# Patient Record
Sex: MALE | URBAN - METROPOLITAN AREA
[De-identification: names, ages, dates, MRNs, and addresses within clinical notes are randomized per-mention and may not be internally consistent; named-entity substitution may affect disease eponyms.]

---

## 2023-08-29 DIAGNOSIS — Z12.11 SCREENING FOR COLON CANCER: Primary | ICD-10-CM

## 2024-09-12 DIAGNOSIS — Z12.11 SCREENING FOR COLON CANCER: Primary | ICD-10-CM

## 2024-09-20 VITALS — WEIGHT: 175 LBS | BODY MASS INDEX: 25.05 KG/M2 | HEIGHT: 70 IN

## 2024-09-20 DIAGNOSIS — Z12.11 COLON CANCER SCREENING: ICD-10-CM

## 2024-09-20 DIAGNOSIS — Z86.0100 HISTORY OF COLON POLYPS: Primary | ICD-10-CM

## 2024-09-20 RX ORDER — TESTOSTERONE CYPIONATE 200 MG/ML
INJECTION, SOLUTION INTRAMUSCULAR
COMMUNITY
Start: 2024-09-10

## 2024-09-20 RX ORDER — SERTRALINE HYDROCHLORIDE 100 MG/1
TABLET, FILM COATED ORAL
COMMUNITY
Start: 2024-09-05

## 2024-09-20 RX ORDER — MELOXICAM 15 MG/1
15 TABLET ORAL
COMMUNITY
Start: 2024-08-28

## 2024-09-20 RX ORDER — LOSARTAN POTASSIUM 50 MG/1
TABLET ORAL
COMMUNITY
Start: 2024-09-05

## 2024-09-20 RX ORDER — COLISTIMETHATE SODIUM 150 MG/1
INJECTION, POWDER, LYOPHILIZED, FOR SOLUTION INTRAMUSCULAR; INTRAVENOUS
COMMUNITY
Start: 2024-08-22

## 2024-09-20 RX ORDER — PREDNISONE 5 MG/1
TABLET ORAL
COMMUNITY
Start: 2024-09-18

## 2024-09-20 RX ORDER — SULFAMETHOXAZOLE AND TRIMETHOPRIM 800; 160 MG/1; MG/1
TABLET ORAL
COMMUNITY
Start: 2024-07-24

## 2024-09-20 RX ORDER — AVACOPAN 10 MG/1
CAPSULE ORAL
COMMUNITY
Start: 2024-09-11

## 2024-09-20 RX ORDER — BUSPIRONE HYDROCHLORIDE 10 MG/1
10 TABLET ORAL 2 TIMES DAILY
COMMUNITY
Start: 2024-09-03

## 2024-09-20 NOTE — TELEPHONE ENCOUNTER
----- Message from Kristen Suresh sent at 9/17/2024  9:28 AM CDT -----  Regarding: New Pt NBP/Est Pt RMM - last colon 09.2018  2nd referral.    Thanks,  Kristen

## 2024-09-20 NOTE — TELEPHONE ENCOUNTER
Patient denied having any current problems or issues. Patient also denied having any hx of seizures, sleep apnea, or kidney disease. Patient had previous colonoscopy with Dr. Robbins. Patients father has known heart disease. Patients paternal grandfather had heart disease as well. Patients paternal grandmother had dementia. Patients maternal grandparents are not unknown. Patients siblings have no known issues. Previous colonoscopy with Dr. Robbins on 9/25/2018. Findings were a single sessile 2 mm polyp of benign appearance was found in the proximal ascending colon. Polyp was incinerated. A single-piece polypectomy was performed using a hot snare. The polyp was completely removed. A single sessile 3 mm of benign appearance was found in the rectum. A single-piece polypectomy was performed using a hot snare. The polyp was completely removed. The polyp was completley retrieved. Medium grade 2 internal hemorrhoids were noted. Scattered adherent stools in left colon-no overt masses noted. Repeat colon in 3 years. Chart was reviewed and updated with patient. Prep instructions were reviewed and sent to patients email at [email or web address].    Colonoscopy @ [facility] by Dr. Ceja on February 25,2025. -Reviewed by LRNATA

## 2024-09-23 RX ORDER — METHOTREXATE 2.5 MG/1
TABLET ORAL
COMMUNITY
Start: 2024-06-07

## 2024-09-30 NOTE — TELEPHONE ENCOUNTER
Medications do not seem like they were reviewed.  Will need clarification on medicines.  On bactrim and colymycin? Takes prednisone daily?  KN

## 2024-10-04 RX ORDER — SOD SULF/POT CHLORIDE/MAG SULF 1.479 G
TABLET ORAL
Qty: 24 TABLET | Refills: 0 | Status: SHIPPED | OUTPATIENT
Start: 2024-10-04

## 2024-10-07 NOTE — TELEPHONE ENCOUNTER
Order printed and faxed to [facility]. @ [facility]. 10/7/24 lra

## 2025-02-18 ENCOUNTER — TELEPHONE (OUTPATIENT)
Dept: GASTROENTEROLOGY | Facility: CLINIC | Age: 65
End: 2025-02-18
Payer: COMMERCIAL

## 2025-02-18 VITALS — WEIGHT: 175 LBS | HEIGHT: 70 IN | BODY MASS INDEX: 25.05 KG/M2

## 2025-02-18 DIAGNOSIS — Z86.0100 HISTORY OF COLON POLYPS: Primary | ICD-10-CM

## 2025-02-18 DIAGNOSIS — Z12.11 COLON CANCER SCREENING: ICD-10-CM

## 2025-02-18 NOTE — TELEPHONE ENCOUNTER
"[facility]  [address] [place] [address]  Phone: [phone]  Fax: [phone]    History & Physical         Provider: Dr. Miranda Ceja    Patient Name: Shane ONEAL (age):1960  64 y.o.           Gender: male   Phone: [phone]     Referring Physician: Radhames Mcfarland     Vital Signs:   Height - 5' 10"  Weight - 175 lb  BMI -  25.11    Plan: Colonoscopy @ [facility]    Encounter Diagnoses   Name Primary?    History of colon polyps Yes    Colon cancer screening            History:      Past Medical History:   Diagnosis Date    Anxiety disorder, unspecified     BMI 25.1     History of colonic polyps     History of skin cancer     Testosterone deficiency     Wegener's granulomatosis without renal involvement     NOT SURE IF ITS WITH OR WITH OUT      Past Surgical History:   Procedure Laterality Date    CLAVICLE SURGERY Left     COLONOSCOPY W/ POLYPECTOMY  2018    EXCISION OF MELANOMA Left     LOWER LEFT BACK    WISDOM TOOTH EXTRACTION        Medication List with Changes/Refills   Current Medications    BUSPIRONE (BUSPAR) 10 MG TABLET    Take 10 mg by mouth 2 (two) times daily.    COLISTIMETHATE (COLYMYCIN) 150 MG INJECTION    EMPTY CONTENTS OF 1 VIAL INTO NASAL IRRIGATION SYSTEM, ADD DISTILLED WATER, SALT PACK, MIX & IRRIGATE PERFORM 2 TIMES DAILY    LOSARTAN (COZAAR) 50 MG TABLET        MELOXICAM (MOBIC) 15 MG TABLET    Take 15 mg by mouth.    METHOTREXATE 2.5 MG TAB    TAKE 4 TABLETS BY MOUTH ONCE WEEKLY - HOLD IF FEVER, FOR INFECTION OR SURGERY    PREDNISONE (DELTASONE) 5 MG TABLET        SERTRALINE (ZOLOFT) 100 MG TABLET        SOD SULF-POT CHLORIDE-MAG SULF (SUTAB) 1.479-0.188- 0.225 GRAM TABLET    Take according to package instructions with indicated amount of water. No breakfast day before test. May substitute with Suprep, Clenpiq, Plenvu, Moviprep or GoLytely based on Rx " plan and patient preference.    SULFAMETHOXAZOLE-TRIMETHOPRIM 800-160MG (BACTRIM DS) 800-160 MG TAB    TAKE ONE TABLET BY MOUTH three times a WEEK FOR 90 DAYS    TAVNEOS 10 MG CAP        TESTOSTERONE CYPIONATE (DEPOTESTOTERONE CYPIONATE) 200 MG/ML INJECTION    inject 0.5mls INTRAMUSCULARLY ONCE a WEEK      Review of patient's allergies indicates:  No Known Allergies   Family History   Problem Relation Name Age of Onset    Crohn's disease Mother      Heart disease Father      No Known Problems Maternal Grandmother      Dementia Paternal Grandmother      Heart disease Paternal Grandfather        Social History[1]     Physical Examination:     General Appearance:___________________________  HEENT: _____________________________________  Abdomen:____________________________________  Heart:________________________________________  Lungs:_______________________________________  Extremities:___________________________________  Skin:_________________________________________  Endocrine:____________________________________  Genitourinary:_________________________________  Neurological:__________________________________      Patient has been evaluated immediately prior to sedation and is medically cleared for endoscopy with IVCS as an ASA class: ______      Physician Signature: _________________________       Date: ________  Time: ________                   [1]   Social History  Tobacco Use    Smoking status: Never    Smokeless tobacco: Never   Substance Use Topics    Alcohol use: Never    Drug use: Never

## 2025-02-18 NOTE — TELEPHONE ENCOUNTER
S/w pt and told him that I was calling as a courtesy regarding up coming Colon with NBP on 2/25/25, Tuesday and wanted to verify that he has his paper prep instructions and meds. Pt stated he has both. I also mentioned that [facility] will call the day before (Mon) with the arrival time, GI Lab is located on the third floor, and to pre-register before next Monday. Margarita

## 2025-02-25 ENCOUNTER — OUTSIDE PLACE OF SERVICE (OUTPATIENT)
Dept: GASTROENTEROLOGY | Facility: CLINIC | Age: 65
End: 2025-02-25

## 2025-02-25 LAB — CRC RECOMMENDATION EXT: NORMAL

## 2025-03-04 ENCOUNTER — RESULTS FOLLOW-UP (OUTPATIENT)
Dept: GASTROENTEROLOGY | Facility: CLINIC | Age: 65
End: 2025-03-04
Payer: COMMERCIAL

## 2025-03-04 NOTE — TELEPHONE ENCOUNTER
1 hyp, repeat colonoscopy (with extended prep and adult colonoscope) in 3 years.     Notify patient that his colon polyp was benign.  Repeat colonoscopy with extended prep and adult colonoscope in 3 years. Confirm recall tab in appointment desk is up-to-date (update if needed).  NBP

## 2025-04-11 ENCOUNTER — DOCUMENTATION ONLY (OUTPATIENT)
Dept: GASTROENTEROLOGY | Facility: CLINIC | Age: 65
End: 2025-04-11
Payer: COMMERCIAL

## 2025-05-15 ENCOUNTER — TELEPHONE (OUTPATIENT)
Dept: PAIN MEDICINE | Facility: CLINIC | Age: 65
End: 2025-05-15
Payer: COMMERCIAL

## 2025-05-15 NOTE — TELEPHONE ENCOUNTER
----- Message from Med Assistant Gibson sent at 5/15/2025 11:06 AM CDT -----  Regarding: possible new pt  Contact: REDDY PATINO [[id number]]    ----- Message -----  From: Noé Wolf  Sent: 5/15/2025  10:14 AM CDT  To: Sushma Lisa Staff    .Type:  Patient Requesting CallWho Called:Brandy Patino, wifeDoes the patient know what this is regarding?:calling on behalf of the pt to schedule, states pt lower back is causing painWould the patient rather a call back or a response via [facility]? [facility]:.[phone] or [phone] Information:

## 2025-05-20 DIAGNOSIS — M54.50 ACUTE LEFT-SIDED LOW BACK PAIN WITHOUT SCIATICA: Primary | ICD-10-CM

## 2025-06-02 ENCOUNTER — TELEPHONE (OUTPATIENT)
Dept: PAIN MEDICINE | Facility: CLINIC | Age: 65
End: 2025-06-02

## 2025-06-02 ENCOUNTER — OFFICE VISIT (OUTPATIENT)
Dept: PAIN MEDICINE | Facility: CLINIC | Age: 65
End: 2025-06-02
Payer: MEDICARE

## 2025-06-02 VITALS
HEART RATE: 64 BPM | HEIGHT: 70 IN | OXYGEN SATURATION: 99 % | SYSTOLIC BLOOD PRESSURE: 167 MMHG | BODY MASS INDEX: 25.05 KG/M2 | DIASTOLIC BLOOD PRESSURE: 99 MMHG | WEIGHT: 175 LBS

## 2025-06-02 DIAGNOSIS — M47.816 LUMBAR SPONDYLOSIS: ICD-10-CM

## 2025-06-02 DIAGNOSIS — M54.16 LUMBAR RADICULOPATHY: Primary | ICD-10-CM

## 2025-06-02 DIAGNOSIS — M54.42 ACUTE LEFT-SIDED LOW BACK PAIN WITH LEFT-SIDED SCIATICA: ICD-10-CM

## 2025-06-02 DIAGNOSIS — M25.652 DECREASED RANGE OF LEFT HIP MOVEMENT: ICD-10-CM

## 2025-06-02 DIAGNOSIS — M53.3 SACROILIAC JOINT PAIN: ICD-10-CM

## 2025-06-02 PROBLEM — C43.59 MALIGNANT MELANOMA OF BACK: Status: ACTIVE | Noted: 2017-10-27

## 2025-06-02 PROBLEM — I10 ESSENTIAL HYPERTENSION: Status: ACTIVE | Noted: 2023-11-27

## 2025-06-02 PROBLEM — E29.1 TESTICULAR HYPOFUNCTION: Status: ACTIVE | Noted: 2023-01-11

## 2025-06-02 PROBLEM — M31.30 GRANULOMATOSIS WITH POLYANGIITIS: Status: ACTIVE | Noted: 2023-10-11

## 2025-06-02 PROBLEM — F41.1 GENERALIZED ANXIETY DISORDER: Status: ACTIVE | Noted: 2023-01-11

## 2025-06-02 PROBLEM — E04.0 SIMPLE GOITER: Status: ACTIVE | Noted: 2023-11-27

## 2025-06-02 PROCEDURE — 99205 OFFICE O/P NEW HI 60 MIN: CPT | Mod: S$PBB,,, | Performed by: PHYSICIAN ASSISTANT

## 2025-06-02 RX ORDER — TIZANIDINE HYDROCHLORIDE 4 MG/1
4 CAPSULE, GELATIN COATED ORAL NIGHTLY PRN
COMMUNITY

## 2025-06-02 RX ORDER — LIDOCAINE 50 MG/G
PATCH TOPICAL
COMMUNITY
Start: 2025-05-19

## 2025-06-03 ENCOUNTER — TELEPHONE (OUTPATIENT)
Dept: PAIN MEDICINE | Facility: CLINIC | Age: 65
End: 2025-06-03
Payer: COMMERCIAL

## 2025-06-04 RX ORDER — GABAPENTIN 300 MG/1
300 CAPSULE ORAL NIGHTLY
Qty: 30 CAPSULE | Refills: 3 | Status: SHIPPED | OUTPATIENT
Start: 2025-06-04 | End: 2025-10-02

## 2025-06-09 ENCOUNTER — RESULTS FOLLOW-UP (OUTPATIENT)
Dept: PAIN MEDICINE | Facility: CLINIC | Age: 65
End: 2025-06-09

## 2025-06-09 NOTE — PROGRESS NOTES
Imaging reviewed, there is a left lateral disc protrusion at L3-4 resulting in severe left foraminal stenosis and moderate left lateral recess stenosis at L3-4. Facet arthropathy greatest at bilateral L4/5 and L5/S1, Modic 1 changes at L3-4, Modic 2 changes at L5-S1.     Please pull images from [facility]  into Epic.  Thanks!

## 2025-06-23 ENCOUNTER — TELEPHONE (OUTPATIENT)
Dept: PAIN MEDICINE | Facility: CLINIC | Age: 65
End: 2025-06-23

## 2025-06-23 ENCOUNTER — OFFICE VISIT (OUTPATIENT)
Dept: PAIN MEDICINE | Facility: CLINIC | Age: 65
End: 2025-06-23
Payer: MEDICARE

## 2025-06-23 VITALS
OXYGEN SATURATION: 95 % | DIASTOLIC BLOOD PRESSURE: 79 MMHG | WEIGHT: 180 LBS | SYSTOLIC BLOOD PRESSURE: 116 MMHG | BODY MASS INDEX: 25.77 KG/M2 | HEART RATE: 66 BPM | HEIGHT: 70 IN

## 2025-06-23 DIAGNOSIS — M53.3 SACROILIAC JOINT PAIN: ICD-10-CM

## 2025-06-23 DIAGNOSIS — M47.816 LUMBAR SPONDYLOSIS: ICD-10-CM

## 2025-06-23 DIAGNOSIS — M54.16 LUMBAR RADICULOPATHY: Primary | ICD-10-CM

## 2025-06-23 DIAGNOSIS — M54.42 ACUTE LEFT-SIDED LOW BACK PAIN WITH LEFT-SIDED SCIATICA: ICD-10-CM

## 2025-06-23 PROCEDURE — 99215 OFFICE O/P EST HI 40 MIN: CPT | Mod: S$PBB,,, | Performed by: PHYSICIAN ASSISTANT

## 2025-06-23 NOTE — PATIENT INSTRUCTIONS
Pre-Procedural Instructions  Interventional Pain  Epidural Steroid Injection    Purpose:  We are performing a procedure in which imaging guidance is being utilized to place a needle in a particular location to allow injection of medications into the epidural space in the spine to relieve pain.   It is important to continue therapeutic exercise with physical therapy or a home exercise program as part of your treatment to maintain range of motion and strength of the joint.         Informed Consent:  These procedures are safe when performed by well-trained physicians, but like any interventional procedure, it does carry rare risks which include:  Spinal Cord or nerve injury which may result in weakness, numbness, difficulty breathing, changes in bowel or bladder function  Infection, abscess   Bleeding, hematoma  Dural puncture  Stroke or heart attack  Seizure  Allergic Reactions  Vasovagal reactions  Arachnoiditis  Other potential complications:  No relief or worsening pain  Headache   Steroids are utilized and can result in temporary:  Facial flushing, headache, insomnia/restlessness  Increased blood sugar  Increased blood pressure  Procedural discomfort: This typically improves with ice to the area in 20 minute intervals in the first 1-2 days after the procedure.     Preparing for the procedure:    Tell your healthcare provider about all medicines you take. This includes over-the-counter medicines, herbs, vitamins, and other supplements.  Tell your healthcare provider about any other medical or dental procedures planned in proximity to your procedure.   Reduce Infection Risk:   Notify clinic if you are:  Having signs of illness, such as fevers, or signs of a urinary tract infection (UTI)  Prescribed antibiotics for an infection  Reduce bleeding risk:  For 7 days prior to procedure and during the duration of the trial (until your clinic follow-up):  Stop NSAIDs (ibuprofen/Advil, naproxen/Aleve, Meloxicam/Mobic,  Goody's, or others)  Stop Floridalma Roosevelt, Pepto Bismol, & Herbal Medication/Supplements (such as Ginko, high dose Vitamin E, Fish Oil, Turmeric, Garlic, and others)  Home Support:  You MUST have a responsible  to bring you home from the facility and assist you at home on the day of the procedure   Plan to not be at work on the day of the procedure.   Medications:  Blood thinners: Such as: Aspirin, Plavix, Warfarin (Coumadin), Eliquis, Pradaxa, Xarelto, & others  If you are taking blood thinning medications, the clinic will notify you if you need to hold and of the number days to hold the medication prior to the procedure and when to restart these after the procedure. This will be communicated with and approved by your prescribing physician.   Please notify the office if you are taking blood thinning medications and are unsure if or when you need to hold the medication.   GLP-1 agonists: Semaglutide (Ozempic, Wegovy, Rybelsus), Tirzepatide (Mounjaro, Zepbound), Dulaglutide (Trulicity), Liraglutide, Lixisenatide, Exenatide  These medications can increase the risk of regurgitation and pulmonary aspiration of gastric contents during general anesthesia and deep sedation  If you take this weekly, please hold for 1 week prior to the procedure  If you take this daily, please hold on the day of procedure  If these are utilized for blood sugar control, you will need to discuss with your managing provider on what to utilize for bridging the antidiabetic therapy to maintain blood sugar control and avoiding hyperglycemia  Please take other regular medications as scheduled.  Diet:  If you WILL be receiving sedation for the procedure.  Do NOT eat anything for 8 hours prior to your procedure.    You may drink clear liquids up to 4 hours prior to your procedure.   Clear liquids include: water, black coffee, fruit juice without pulp, clear tea  You may drink water up to 2 hours prior to your procedure  You may use a sip of water  to take your regular medications  If you are NOT receiving sedation for the procedure:  Do not eat or drink anything for 2 hours prior to your procedure. You may eat a light meal more than 2 hours prior to your procedure.   For Diabetic Patients:  Please discuss with your managing physician the best way to take your medications on the procedure day to minimize large increases or decreases in your blood sugar  Please notify clinic of your most recent A1c and when that was performed. Optimizing your blood sugar control prior to the procedure will help decrease your risk of complications, such as infection.   Notify clinic and we will attempt to schedule your procedure as early in the morning as possible to minimize hypoglycemia that may occur while fasting for the procedure.  Please inform clinic if: Dr. Maria L Ordaz's clinic phone number is [phone]  You are pregnant or attempting to become pregnant  You have an implanted electronic device (pacemaker, defibrillator, medication pump, stimulator, etc.)  You are having signs of infection noted above or are started on antibiotics.  You are allergic to iodinated contrast agents, local anesthetics, or steroids.  You are having other medical procedures around the time of your procedure (within 2 weeks)    Instructions for procedure day:    We ask that you please leave your valuables at home  Avoid moisturizers or scented personal products  Wear loose fitting clothing that you can easily change in & out of  Plan to not be at work on the day of the procedure.   Please remove jewelry.  If you are having a procedure done on your head or neck, please remove any metallic items or hardware, such as dental hardware, jewelry that may obscure the images of the area during the procedure.     After the procedure: You will be sent to a recovery room after the procedure. You will go home the same day.     Home Care Instructions:    Injection site(s)  The injection sites may be  covered with a dressing.  You may remove bandage at discharge from the hospital.   Bruising may be present  Avoid soaking or bathing in hot tub, bath or pool on the day of your procedure. Okay to submerge site day after procedure.   Showering is okay the day of procedure.   Avoid heat to the area  Notify the clinic if you are experiencing increased bleeding or drainage from the injection site(s)  Post-procedure pain:  Mild-moderate pain/discomfort may occur  Pain may be relieved with:  Ice pack or bag of frozen peas (or something similar) wrapped in a thin towel to reduce the swelling & pain around incision. Place ice to area for 20 minutes, then remove for 20 minutes and repeat as needed.   OTC Tylenol (Acetaminophen)  Prescription pain medication if needed  Procedural pain typically improves after approximately 1-3 days  Activity/Diet:  Day of the procedure:  Do NOT drive or operate heavy machinery  Avoid strenuous activity, heavy lifting, bending or twisting.   Åvoid activity that requires skill, dexterity or coordination  Avoid independent activities, and have assistance available until normal sensation has returned  If you received sedation - For 24 hrs following the procedure DO NOT:   Drive or operate heavy machinery  Drink alcohol  Make any important decisions  Day after the procedure: Resume daily life activities as tolerated:  Let pain be your guide. If possible, avoid activities that exacerbate your pain  Progress slowly and try not to over exert yourself if you are feeling good  Bed rest is NOT recommended   Physical Therapy (PT): You may restart your home exercise program the day following your procedure.  Diet: You may resume your normal diet as tolerated after the procedure  If nauseated, hold solid foods until nausea has resolved  Medications:  If you held any blood thinner medications for the procedure, you should have been given a specific timeline on when to restart the medication that has been  determined in collaboration with your prescriber and our clinic. If you do not know when to restart, please notify clinic.  You may continue all other regular medications as prescribed.     When to call your healthcare provider [phone]  Call your healthcare provider if you have any of these symptoms:  Fever of 100.4°F (38.0°C) or higher. If you feel hot, take your temperature before notifying clinic.  New pain, weakness, tingling, or numbness in your legs. This may be expected in the first several hours after the procedure due to the local anesthetic used during the procedure.   New or worsening back pain   Redness, drainage or bleeding from site  Changes in bowel (incontinence) function  Changes in bladder (incontinence or retention) function  New or unusual headache &/or neck stiffness  Persistent nausea or vomiting with inability to tolerate clear liquids for more than 12 hours       When to seek medical attention  Call 911 right away if you have any of the following:  Chest pain  Shortness of breath  Signs of a stroke: Sudden changes in vision, changes in speech, sudden weakness in your face/arms/legs  Any other medical emergency     Follow-up: Post-op clinic appointment is typically 2-4 weeks after procedure.      Dr. Maria L Ordaz M.D.  [facility]  Phone: [phone]

## 2025-06-23 NOTE — TELEPHONE ENCOUNTER
----- Message from Martha sent at 6/23/2025 11:04 AM CDT -----  Regarding: PA  Approved Auth# [id number] for dates 06/30 to 07/14/25

## 2025-06-23 NOTE — PROGRESS NOTES
[facility]        Chief Complaint:   Chief Complaint   Patient presents with    Low-back Pain       History of Present Illness: Shane Patino is a 65 y.o. male referred by Rayshawn Saldaña NP for lower back pain.      LBP  Onset: May 2025   Onset was Gradual  Accident or Work Related: No    Since Onset, Pain has been worsening  Location: left low back  Radiation: left buttocks, left lateral hip into left anterior thigh, stays above knee  Quality: Aching  Timing: Constant  Exacerbating Factors: Standing, Walking, Lifting, and Morning time, Extension   Alleviating Factors: Sitting, Lying down, and Cold/Ice, Flexion  Review of Symptoms: Denies weakness in leg(s) , Denies numbness in leg(s) , Denies saddle anesthesia, Denies night sweats, Denies fevers, (+) changes in gait, (+) pain waking at night, (+) history of cancer (melanoma), Denies unexplained weight loss, Denies changes in bowel function, Denies changes in bladder function.    Patient has  failed > 4 weeks of conservative treatment including: Activity modification (avoiding exacerbating factors), chiropractic care, and oral medications. Physical therapy has not been attempted.     Pain Severity Scores:   Currently: 10/10      Worst Pain: 10/10     Least Pain: 3/10  Average Pain: 8/10    Pain Disability Index  Family/Home Responsibilities:: 10  Recreation:: 10  Social Activity:: 7  Occupation:: 10  Sexual Behavior:: 9  Self Care:: 5  Life-Support Activities:: 6  Pain Disability Index (PDI): 57    Opioid Risk Score       None             Prior Diagnostic Evaluation: X-ray      Previous Therapies/Treatments:  Activity Modification (rest, avoiding aggravating factors, etc.): Yes - Helpful  Heat (heating pads, etc.): Yes - Helpful  Cold (ice packs): Yes - Helpful  Physician guided home exercise program: Yes - Not Helpful  PT/OT: Not tried  Chiropractor: Yes - Not Helpful  Acupuncture: Not tried  Massage: Not tried  Bracing: Not tried  TENS unit: Yes -  Not Helpful    Previous Medications:   - NSAIDS: Acetaminophen (Tylenol) Tried - Helpful and Ibuprofen (Advil) Tried - Helpful  - Muscle Relaxants: Methocarbamol (Robaxin) Tried - Not Helpful, Tizanidine - Not Helpful  - TCAs: Not tried  - SNRIs/Antidepressants for Pain: Not tried  - Topicals: Menthol methyl salicylate (Bengay, Acuplus, etc.) Tried - Not Helpful  - Anticonvulsants: Gabapentin (Neurontin) Tried - Not Helpful  - Opioids: Hydrocodone/Acetaminophen (e.g., Lorcet, Norco, Lortab)    Relevant Surgery: no     Previous Interventions for Pain:  - Not tried      Interval History (06/23/2025):  Shane Patino returns today for follow up.  At the last clinic visit, referred to physical therapy. Order MRI, Rx Gabapentin 300mg qHS    Physical therapy provided 20% relief.     Currently, the low back  pain is improved from prior but still limiting. The left lower back and left leg pain is unchanged in character or location. Denies any changes in bowel or bladder function. Denies any new weakness or new numbness since the most recent visit. Denies any fevers or recent infections/antibiotics. Denies any unexplained weight loss.     He is taking Gabapentin 300mg qHS, which is providing relief without side effects.    Current Pain Scales:  Current: 5/10              Average: 5/10  Least-Worst: 2-8/10           6/23/2025     8:46 AM 6/2/2025     8:14 AM   Last 3 PDI Scores   Pain Disability Index (PDI) 52 57            Current Pain Medications:  Tizanidine 4mg qHS PRN  Meloxicam 15mg   Gabapentin 300mg qHS    Blood Thinners: NONE    Full Medication List:  Current Medications[1]     Review of Systems: See HPI    Allergies:  Patient has no known allergies.     Medical History:   has a past medical history of Anxiety disorder, unspecified, BMI 25.1, History of colonic polyps, History of skin cancer, Testosterone deficiency, and Wegener's granulomatosis without renal involvement.    Surgical History:   has a past surgical  "history that includes Clavicle surgery (Left); Excision of melanoma (Left); [place] tooth extraction; and Colonoscopy w/ polypectomy (09/25/2018).    Social History:   reports that he has never smoked. He has never used smokeless tobacco. He reports that he does not drink alcohol and does not use drugs.    Family History:  family history includes Crohn's disease in his mother; Dementia in his paternal grandmother; Heart disease in his father and paternal grandfather; No Known Problems in his maternal grandmother.      Physical Exam:  /79 (Patient Position: Sitting)   Pulse 66   Ht 5' 10" (1.778 m)   Wt 81.6 kg (180 lb)   SpO2 95%   BMI 25.83 kg/m²   GEN: No acute distress. Calm, comfortable  HENT: Normocephalic, atraumatic, moist mucous membranes  EYE: Anicteric sclera, non-injected.   CV: Non-diaphoretic. Regular Rate. Radial Pulses 2+.  RESP: Breathing comfortably. Chest expansion symmetric.  EXT: No clubbing, cyanosis.   SKIN: Warm, & dry to palpation. No visible rashes or lesions of exposed skin.   PSYCH: Pleasant mood and appropriate affect. Recent and remote memory intact.   GAIT: Independent, normal ambulation  Lumbar Spine Exam:       Inspection: No erythema, bruising. Right Lateral shift.       Palpation:   - (+) TTP of lumbar paraspinals on left.  - (+) TTP of sacroiliac joint on left.  - (-) TTP of mildline spinous processes       ROM: + Limited in flexion. (+) limited in extension, (-) limitation in lateral bending bilateral.    Directional Preference: Flexion      Provocative Maneuvers:  (+) Facet loading bilaterally  (-) Straight Leg Raise bilaterally  (-) XI bilaterally  Hip Exam:      Inspection: No gross deformity or apparent leg length discrepancy      Palpation: - TTP to greater trochanteric bursa(s) bilaterally.       ROM: (+) limitation in internal rotation right, (-) limitation in external rotation bilateral  Neurologic Exam:     Alert. Speech is fluent and appropriate.     " Strength:  5/5 throughout bilateral lower extremities     Sensation:  Grossly intact to light touch in bilateral lower extremities     Reflexes: 2+ in b/l patella, achilles     Tone: No abnormality appreciated in bilateral lower extremities     No Clonus            Imaging:  - MRI Lumbar spine 6/6/25:  Probable hemangioma in the L5 vertebral body measuring 2.3 cm in craniocaudal dimension. Severe degenerative disc disease L3-L4 with Modic type I discogenic marrow edema. Modic type II fatty degenerative marrow signal L5-S1. Mild Modic type II degenerative marrow signal at L1-L2 with anterior osteophytes. Dextroscoliosis   Otherwise, normal vertebral body height, alignment, and marrow signal.  Distal spinal cord and Conus are within normal limits.     L1-2:  No evidence of spinal canal or foraminal stenosis.     L2-3:  Unremarkable     L3-4:  Degenerative disc disease with disc desiccation disc height loss and marginal osteophytes and Modic type I discogenic marrow edema with left foraminal left lateral disc protrusion severely narrowing the left neural foramen and impinging on the left-sided L3 nerve. Mild narrowing of the right L3 neural foramen. Annular disc bulging and facet DJD ligament thickening and small bilateral facet joint effusions with moderate left and mild right anterolateral recess stenosis and potential for traversing nerve root impingement.     L4-5:  Annular disc bulge, facet DJD, ligament thickening, bilateral facet joint effusions moderately narrowing the anterolateral recess in the bilateral neural foramen     L5-S1:  Right foraminal subarticular disc protrusion moderate to severely narrowing the right neural foramen and the right anterolateral recess with moderate left anterolateral recess stenosis and mild left foraminal stenosis. Facet DJD and facet joint effusions contributes to stenosis.      - X-ray Lumbar spine 06/02/25:   Mild degenerative disc narrowing of the L3-4 disc level.  Minor  "multilevel spurring.     The alignment of the lumbar vertebra is normal.  No subluxation with flexion or extension.  Increased narrowing of the anterior disc space at L3-4 with flexion.    - X-ray Bilateral hips 25:   FINDINGS: 2 views. No acute fracture or dislocation. No acute bony abnormality. Joint spaces are relatively well-preserved.     Labs:  CMP 25:      CBC 25:        BMP  No results found for: "NA", "K", "CL", "CO2", "BUN", "CREATININE", "CALCIUM", "ANIONGAP", "EGFRNORACEVR"  No results found for: "ALT", "AST", "GGT", "ALKPHOS", "BILITOT"  No results found for: "PLT"  No results found for: "LABA1C", "HGBA1C"      Assessment:  Shane Patino is a 65 y.o. male with the following diagnoses based on history, exam, and imagin. Lumbar radiculopathy    2. Lumbar spondylosis    3. Acute left-sided low back pain with left-sided sciatica    4. Sacroiliac joint pain          This is a pleasant 65 y.o. gentleman presenting with:     - Left lower back and left radicular leg pain in L3/4 disribution   - MRI with left lateral disc protrusion at L3-4 resulting in severe left foraminal stenosis and moderate left lateral recess stenosis at L3-4. Facet arthropathy greatest at bilateral L4/5 and L5/S1, Modic 1 changes at L3-4, Modic 2 changes at L5-S1.    - Patient with Lumbosacral radiculopathy/radicular pain due to foraminal and lateral recess stenosis at L3-4.. The pain is severe enough to greatly impact quality of life &/or function as evidenced on the patient's disability scores and NRS.    - Pain persists despite > 4 weeks of conservative treatment.   - Comorbidities: Wegener's Granulomatosis. Melanoma. HTN. Anxiety. Depression.    Treatment Plan:   - PT/OT/HEP: Cont PT. Discussed benefits of exercise for pain.   - Procedures: Schedule left L3 & left L4 transforaminal epidural steroid injection under fluoroscopic guidance with local/MAC sedation.  sedation. (CPT Code 59803 & 14776). The patient " is not allergic to iodinated contrast which will be used for the procedure.. Patient is not currently taking blood thinners for cardiovascular prophylaxis   - Medications:  Cont Gabapentin 300mg qHS.   - Imaging: Reviewed.   - Labs: Reviewed.     Follow Up: RTC 2 weeks after MACK or sooner PRN    I spent a total of 41 minutes on the day of the visit. This includes face to face time and non-face to face time preparing to see the patient (eg, review of tests), obtaining and/or reviewing separately obtained history, documenting clinical information in the electronic or other health record, independently interpreting results and communicating results to the patient/family/caregiver, or care coordinator.    Kristina Lees PA-C  Interventional Pain Medicine            [1]   Current Outpatient Medications:     0.9% NaCl SolP 500 mL with riTUXimab 10 mg/mL Conc 500 mg/m2, Inject 500 mg/m2 into the vein every 6 (six) months., Disp: , Rfl:     busPIRone (BUSPAR) 10 MG tablet, Take 10 mg by mouth 2 (two) times daily., Disp: , Rfl:     colistimethate (COLYMYCIN) 150 mg injection, EMPTY CONTENTS OF 1 VIAL INTO NASAL IRRIGATION SYSTEM, ADD DISTILLED WATER, SALT PACK, MIX & IRRIGATE PERFORM 2 TIMES DAILY, Disp: , Rfl:     gabapentin (NEURONTIN) 300 MG capsule, Take 1 capsule (300 mg total) by mouth every evening., Disp: 30 capsule, Rfl: 3    LIDOcaine (LIDODERM) 5 %, , Disp: , Rfl:     losartan (COZAAR) 50 MG tablet, , Disp: , Rfl:     meloxicam (MOBIC) 15 MG tablet, Take 15 mg by mouth., Disp: , Rfl:     predniSONE (DELTASONE) 5 MG tablet, , Disp: , Rfl:     sertraline (ZOLOFT) 100 MG tablet, , Disp: , Rfl:     sulfamethoxazole-trimethoprim 800-160mg (BACTRIM DS) 800-160 mg Tab, Take 1 tablet by mouth 3 (three) times a week., Disp: , Rfl:     TAVNEOS 10 mg Cap, , Disp: , Rfl:     testosterone cypionate (DEPOTESTOTERONE CYPIONATE) 200 mg/mL injection, inject 0.5mls INTRAMUSCULARLY ONCE a WEEK, Disp: , Rfl:     tiZANidine 4 mg Cap,  Take 4 mg by mouth nightly as needed., Disp: , Rfl:

## 2025-06-30 ENCOUNTER — OUTSIDE PLACE OF SERVICE (OUTPATIENT)
Dept: PAIN MEDICINE | Facility: CLINIC | Age: 65
End: 2025-06-30

## 2025-07-21 ENCOUNTER — OFFICE VISIT (OUTPATIENT)
Dept: PAIN MEDICINE | Facility: CLINIC | Age: 65
End: 2025-07-21
Payer: MEDICARE

## 2025-07-21 VITALS
SYSTOLIC BLOOD PRESSURE: 122 MMHG | BODY MASS INDEX: 25.77 KG/M2 | HEART RATE: 66 BPM | WEIGHT: 180 LBS | OXYGEN SATURATION: 97 % | DIASTOLIC BLOOD PRESSURE: 80 MMHG | HEIGHT: 70 IN

## 2025-07-21 DIAGNOSIS — M54.51 VERTEBROGENIC LOW BACK PAIN: ICD-10-CM

## 2025-07-21 DIAGNOSIS — M47.816 LUMBAR SPONDYLOSIS: ICD-10-CM

## 2025-07-21 DIAGNOSIS — D18.09 HEMANGIOMA OF VERTEBRAL BODY: Primary | ICD-10-CM

## 2025-07-21 DIAGNOSIS — M53.3 SACROILIAC JOINT PAIN: ICD-10-CM

## 2025-07-21 PROCEDURE — 99215 OFFICE O/P EST HI 40 MIN: CPT | Mod: S$PBB,,, | Performed by: PHYSICIAN ASSISTANT

## 2025-07-21 RX ORDER — RITUXIMAB 10 MG/ML
500 INJECTION, SOLUTION INTRAVENOUS
COMMUNITY

## 2025-07-21 RX ORDER — BUDESONIDE, MICRONIZED 100 %
1 POWDER (GRAM) MISCELLANEOUS 2 TIMES DAILY PRN
COMMUNITY

## 2025-07-21 RX ORDER — SOD SULF/POT CHLORIDE/MAG SULF 1.479 G
TABLET ORAL
COMMUNITY
End: 2025-07-21

## 2025-07-21 NOTE — PROGRESS NOTES
[facility]        Chief Complaint:   Chief Complaint   Patient presents with    Low-back Pain       History of Present Illness: Shane Patino is a 65 y.o. male referred by Rayshawn Saldaña NP for lower back pain.      LBP  Onset: April/May 2025   Onset was Gradual  Accident or Work Related: No    Since Onset, Pain has been worsening  Location: left low back  Radiation: left buttocks, left lateral hip into left anterior thigh, stays above knee  Quality: Aching  Timing: Constant  Exacerbating Factors: Standing, Walking, Lifting, and Morning time, Extension   Alleviating Factors: Sitting, Lying down, and Cold/Ice, Flexion  Review of Symptoms: Denies weakness in leg(s) , Denies numbness in leg(s) , Denies saddle anesthesia, Denies night sweats, Denies fevers, (+) changes in gait, (+) pain waking at night, (+) history of cancer (melanoma), Denies unexplained weight loss, Denies changes in bowel function, Denies changes in bladder function.    Patient has  failed > 4 weeks of conservative treatment including: Activity modification (avoiding exacerbating factors), chiropractic care, and oral medications. Physical therapy has not been attempted.     Pain Severity Scores:   Currently: 10/10      Worst Pain: 10/10     Least Pain: 3/10  Average Pain: 8/10    Pain Disability Index  Family/Home Responsibilities:: 10  Recreation:: 10  Social Activity:: 7  Occupation:: 10  Sexual Behavior:: 9  Self Care:: 5  Life-Support Activities:: 6  Pain Disability Index (PDI): 57    Opioid Risk Score       None             Prior Diagnostic Evaluation: X-ray      Previous Therapies/Treatments:  Activity Modification (rest, avoiding aggravating factors, etc.): Yes - Helpful  Heat (heating pads, etc.): Yes - Helpful  Cold (ice packs): Yes - Helpful  Physician guided home exercise program: Yes - Not Helpful  PT/OT: Not tried  Chiropractor: Yes - Not Helpful  Acupuncture: Not tried  Massage: Not tried  Bracing: Not tried  TENS unit:  Yes - Not Helpful    Previous Medications:   - NSAIDS: Acetaminophen (Tylenol) Tried - Helpful and Ibuprofen (Advil) Tried - Helpful  - Muscle Relaxants: Methocarbamol (Robaxin) Tried - Not Helpful, Tizanidine - Not Helpful  - TCAs: Not tried  - SNRIs/Antidepressants for Pain: Not tried  - Topicals: Menthol methyl salicylate (Bengay, Acuplus, etc.) Tried - Not Helpful  - Anticonvulsants: Gabapentin (Neurontin) Tried - Not Helpful  - Opioids: Hydrocodone/Acetaminophen (e.g., Lorcet, Norco, Lortab)    Relevant Surgery: no     Previous Interventions for Pain:  - 6/30/25: Left L3 & L4 TF MACK w/ 75% relief       Interval History (06/23/2025):  Shane Patino returns today for follow up.  At the last clinic visit, referred to physical therapy. Order MRI, Rx Gabapentin 300mg qHS    Physical therapy provided 20% relief.     Currently, the low back  pain is improved from prior but still limiting. The left lower back and left leg pain is unchanged in character or location. Denies any changes in bowel or bladder function. Denies any new weakness or new numbness since the most recent visit. Denies any fevers or recent infections/antibiotics. Denies any unexplained weight loss.     He is taking Gabapentin 300mg qHS, which is providing relief without side effects.    Current Pain Scales:  Current: 5/10              Average: 5/10  Least-Worst: 2-8/10           7/21/2025     9:08 AM 6/23/2025     8:46 AM 6/2/2025     8:14 AM   Last 3 PDI Scores   Pain Disability Index (PDI) 34 52 57       Interval History (07/21/2025):  Shane Patino returns today for follow up.  At the last clinic visit, scheduled left L3 & left L4 transforaminal epidural steroid injection under fluoroscopic guidance with local/MAC sedation.    Left L3 & left L4 TF MACK provided 75% relief and greater relief of left leg pain.     Currently, the low back pain is improved but still limiting. Pain is now to left lower back without radiation into lower extremity.  "Denies any changes in bowel or bladder function. Denies any new weakness or new numbness since the most recent visit. Denies any fevers or recent infections/antibiotics.     He continues Gabapentin 300mg qHS, which is helping with pain and sleep. Denies side effects.    Current Pain Scales:  Current: 4/10              Average: 4/10  Least-Worst: 3-7/10           7/21/2025     9:08 AM 6/23/2025     8:46 AM 6/2/2025     8:14 AM   Last 3 PDI Scores   Pain Disability Index (PDI) 34 52 57         Current Pain Medications:  Tizanidine 4mg qHS PRN  Meloxicam 15mg   Gabapentin 300mg qHS    Blood Thinners: NONE    Full Medication List:  Current Medications[1]     Review of Systems: See HPI    Allergies:  Patient has no known allergies.     Medical History:   has a past medical history of Anxiety disorder, unspecified, BMI 25.1, History of colonic polyps, History of skin cancer, Testosterone deficiency, and Wegener's granulomatosis without renal involvement.    Surgical History:   has a past surgical history that includes Clavicle surgery (Left); Excision of melanoma (Left); [place] tooth extraction; and Colonoscopy w/ polypectomy (09/25/2018).    Social History:   reports that he has never smoked. He has never used smokeless tobacco. He reports that he does not drink alcohol and does not use drugs.    Family History:  family history includes Crohn's disease in his mother; Dementia in his paternal grandmother; Heart disease in his father and paternal grandfather; No Known Problems in his maternal grandmother.      Physical Exam:  /80   Pulse 66   Ht 5' 10" (1.778 m)   Wt 81.6 kg (180 lb)   SpO2 97%   BMI 25.83 kg/m²   GEN: No acute distress. Calm, comfortable  HENT: Normocephalic, atraumatic, moist mucous membranes  EYE: Anicteric sclera, non-injected.   CV: Non-diaphoretic. Regular Rate. Radial Pulses 2+.  RESP: Breathing comfortably. Chest expansion symmetric.  EXT: No clubbing, cyanosis.   SKIN: Warm, & dry to " palpation. No visible rashes or lesions of exposed skin.   PSYCH: Pleasant mood and appropriate affect. Recent and remote memory intact.   GAIT: Independent, normal ambulation  Lumbar Spine Exam:       Inspection: No erythema, bruising. Right Lateral shift.       Palpation:   - (+) TTP of lumbar paraspinals on left.  - (+) TTP of sacroiliac joint on left.  - (-) TTP of mildline spinous processes       ROM: + Limited in flexion. (+) limited in extension, (-) limitation in lateral bending bilateral.    Directional Preference: Flexion      Provocative Maneuvers:  (+) Facet loading bilaterally  (-) Straight Leg Raise bilaterally  (-) XI bilaterally  Hip Exam:      Inspection: No gross deformity or apparent leg length discrepancy      Palpation: - TTP to greater trochanteric bursa(s) bilaterally.       ROM: (+) limitation in internal rotation right, (-) limitation in external rotation bilateral  Neurologic Exam:     Alert. Speech is fluent and appropriate.     Strength:  5/5 throughout bilateral lower extremities     Sensation:  Grossly intact to light touch in bilateral lower extremities     Reflexes: 2+ in b/l patella, achilles     Tone: No abnormality appreciated in bilateral lower extremities     No Clonus            Imaging:  - MRI Lumbar spine 6/6/25:  Probable hemangioma in the L5 vertebral body measuring 2.3 cm in craniocaudal dimension. Severe degenerative disc disease L3-L4 with Modic type I discogenic marrow edema. Modic type II fatty degenerative marrow signal L5-S1. Mild Modic type II degenerative marrow signal at L1-L2 with anterior osteophytes. Dextroscoliosis   Otherwise, normal vertebral body height, alignment, and marrow signal.  Distal spinal cord and Conus are within normal limits.     L1-2:  No evidence of spinal canal or foraminal stenosis.     L2-3:  Unremarkable     L3-4:  Degenerative disc disease with disc desiccation disc height loss and marginal osteophytes and Modic type I discogenic  "marrow edema with left foraminal left lateral disc protrusion severely narrowing the left neural foramen and impinging on the left-sided L3 nerve. Mild narrowing of the right L3 neural foramen. Annular disc bulging and facet DJD ligament thickening and small bilateral facet joint effusions with moderate left and mild right anterolateral recess stenosis and potential for traversing nerve root impingement.     L4-5:  Annular disc bulge, facet DJD, ligament thickening, bilateral facet joint effusions moderately narrowing the anterolateral recess in the bilateral neural foramen     L5-S1:  Right foraminal subarticular disc protrusion moderate to severely narrowing the right neural foramen and the right anterolateral recess with moderate left anterolateral recess stenosis and mild left foraminal stenosis. Facet DJD and facet joint effusions contributes to stenosis.      - X-ray Lumbar spine 25:   Mild degenerative disc narrowing of the L3-4 disc level.  Minor multilevel spurring.     The alignment of the lumbar vertebra is normal.  No subluxation with flexion or extension.  Increased narrowing of the anterior disc space at L3-4 with flexion.    - X-ray Bilateral hips 25:   FINDINGS: 2 views. No acute fracture or dislocation. No acute bony abnormality. Joint spaces are relatively well-preserved.     Labs:  CMP 25:      CBC 25:        BMP  No results found for: "NA", "K", "CL", "CO2", "BUN", "CREATININE", "CALCIUM", "ANIONGAP", "EGFRNORACEVR"  No results found for: "ALT", "AST", "GGT", "ALKPHOS", "BILITOT"  No results found for: "PLT"  No results found for: "LABA1C", "HGBA1C"      Assessment:  Shane Patino is a 65 y.o. male with the following diagnoses based on history, exam, and imagin. Hemangioma of vertebral body  -     CT Lumbar Spine Without Contrast; Future; Expected date: 2025    2. Lumbar spondylosis    3. Sacroiliac joint pain    4. Vertebrogenic low back pain            This " is a pleasant 65 y.o. gentleman presenting with:     - Left lower back and left radicular leg pain in L3/4 disribution   - MRI with left lateral disc protrusion at L3-4 resulting in severe left foraminal stenosis and moderate left lateral recess stenosis at L3-4. Facet arthropathy with facet joint effusions at bilateral L4/5 and L5/S1, Modic 1 changes at L3-4, Modic 2 changes at L5-S1.    - Patient with moderate to severe chronic low back pain that is predominantly axial with radicular pain that resolved with MACK, but axial pain persists.. The pain is severe enough to greatly impact quality of life &/or function as evidenced on the patients functional/disability score and NRS.    - Pain persists for greater than 3 months despite conservative treatment, including: Activity modification (avoiding exacerbating factors), physical therapy, chiropractic care, physician-led home exercise program, and oral medications.    - Facet joints currently suspected as primary pain generator based on clinical assessment & radiology studies, as there is no fracture, tumor, infection, and significant deformity. There is NO surgical fusion (such as Anterior Lumbar Interbody Fusion) at the spinal segment to be targeted..   - This facet level has not undergone ablation in the previous 2 years.   - Comorbidities: Wegener's Granulomatosis. Melanoma. HTN. Anxiety. Depression.    Treatment Plan:   - PT/OT/HEP: Cont PT, can transition to HEP. Discussed benefits of exercise for pain.   - Procedures: Schedule diagnostic medial branch block of left L4/5 & L5/S1 facet joints under fluoroscopic guidance with local sedation. (CPT Codes 64282, 09359, KX modifier). If MBB successful x 2, plan for RFA. The patient is not allergic to iodinated contrast. Patient is not currently taking blood thinners for cardiovascular prophylaxis   - Medications:  Cont Gabapentin 300mg qHS.   - Imaging: Reviewed. Order CT of Lumbar spine w/o contrast to further assess  probable hemangioma at L5   - Labs: Reviewed.     Follow Up: RTC ASAP after MBB or sooner PRN    I spent a total of 41 minutes on the day of the visit. This includes face to face time and non-face to face time preparing to see the patient (eg, review of tests), obtaining and/or reviewing separately obtained history, documenting clinical information in the electronic or other health record, independently interpreting results and communicating results to the patient/family/caregiver, or care coordinator.    Kristina Lees PA-C  Interventional Pain Medicine              [1]   Current Outpatient Medications:     0.9% NaCl SolP 500 mL with riTUXimab 10 mg/mL Conc 500 mg/m2, Inject 500 mg/m2 into the vein every 6 (six) months., Disp: , Rfl:     budesonide, micronized, bulk, 100 % Powd, 1 Application by Nasal route 2 (two) times daily as needed., Disp: , Rfl:     busPIRone (BUSPAR) 10 MG tablet, Take 10 mg by mouth 2 (two) times daily., Disp: , Rfl:     colistimethate (COLYMYCIN) 150 mg injection, EMPTY CONTENTS OF 1 VIAL INTO NASAL IRRIGATION SYSTEM, ADD DISTILLED WATER, SALT PACK, MIX & IRRIGATE PERFORM 2 TIMES DAILY, Disp: , Rfl:     gabapentin (NEURONTIN) 300 MG capsule, Take 1 capsule (300 mg total) by mouth every evening., Disp: 30 capsule, Rfl: 3    LIDOcaine (LIDODERM) 5 %, , Disp: , Rfl:     losartan (COZAAR) 50 MG tablet, , Disp: , Rfl:     meloxicam (MOBIC) 15 MG tablet, Take 15 mg by mouth., Disp: , Rfl:     predniSONE (DELTASONE) 5 MG tablet, , Disp: , Rfl:     riTUXimab (RITUXAN) 10 mg/mL injection, Inject 500 mg into the vein., Disp: , Rfl:     sertraline (ZOLOFT) 100 MG tablet, , Disp: , Rfl:     sulfamethoxazole-trimethoprim 800-160mg (BACTRIM DS) 800-160 mg Tab, Take 1 tablet by mouth 3 (three) times a week., Disp: , Rfl:     TAVNEOS 10 mg Cap, , Disp: , Rfl:     testosterone cypionate (DEPOTESTOTERONE CYPIONATE) 200 mg/mL injection, inject 0.5mls INTRAMUSCULARLY ONCE a WEEK, Disp: , Rfl:     tiZANidine  4 mg Cap, Take 4 mg by mouth nightly as needed., Disp: , Rfl:

## 2025-07-21 NOTE — PATIENT INSTRUCTIONS
Pre-Procedural Instructions  Interventional Pain  Medial Branch Block    Purpose:  We are performing diagnostic blocks of particular nerves in order to determine if performing a radiofrequency ablation (burning) of those nerves will provide relief of your pain.   The relief is temporary and used to determine the next step in your treatment  Please provide honest results in the amount of pain relief you obtained. Don't worry, you will not hurt our feelings.  We want to help relieve your pain and can potentially target different structures in order to provide you better relief.   Informed Consent:  These procedures are safe, but like any interventional procedure, it does carry rare risks which include:  Infection   Bleeding  Allergic Reactions  No relief of pain  Temporary sense of imbalance/dizziness can occur when performing cervical medial branch blocks, especially of the third occipital nerve.     Preparing for the procedure:    Tell your healthcare provider about all medicines you take. This includes over-the-counter medicines, herbs, vitamins, and other supplements.  Tell your healthcare provider about any other medical or dental procedures planned in proximity to your procedure.   Reduce Infection Risk:   Shower or bathe the night before and morning of your scheduled procedure.  Notify clinic if you are:  Having signs of illness, such as fevers, or signs of a urinary tract infection (UTI)  Prescribed antibiotics for an infection  Reduce bleeding risk:  For 7 days prior to procedure and during the duration of the trial (until your clinic follow-up):  Stop NSAIDs (ibuprofen/Advil, naproxen/Aleve, Meloxicam/Mobic, Goody's, or others)  Stop [place], Pepto Bismol, & Herbal Medication/Supplements (such as Ginko, high dose Vitamin E, Fish Oil, Turmeric, Garlic, and others)  Home Support:  You MUST have a responsible  to bring you home from the facility and assist you at home on the day of the procedure    Plan to not be at work on the day of the procedure.   Medications:  Blood thinners: Such as: Aspirin, Plavix, Warfarin (Coumadin), Eliquis, Pradaxa, Xarelto, & others  If you are taking blood thinning medications, the clinic will notify you if you need to hold and of the number days to hold the medication prior to the procedure and when to restart these after the procedure. This will be communicated with and approved by your prescribing physician.   Please notify the office if you are taking blood thinning medications and are unsure if or when you need to hold the medication.   GLP-1 agonists: Semaglutide (Ozempic, Wegovy, Rybelsus), Tirzepatide (Mounjaro, Zepbound), Dulaglutide (Trulicity), Liraglutide, Lixisenatide, Exenatide  These medications can increase the risk of regurgitation and pulmonary aspiration of gastric contents during general anesthesia and deep sedation  If you take this weekly, please hold for 1 week prior to the procedure  If you take this daily, please hold on the day of procedure  If these are utilized for blood sugar control, you will need to discuss with your managing provider on what to utilize for bridging the antidiabetic therapy to maintain blood sugar control and avoiding hyperglycemia  Please take other regular medications as scheduled.  Diet:  If you will be receiving sedation for the procedure.  Do not eat anything for 8 hours prior to your procedure.   You may drink clear liquids up to 4 hours prior to your procedure.   Clear liquids include: water, black coffee, fruit juice without pulp, clear tea  You may drink water up to 2 hours prior to your procedure.  You may use a sip of water to take your regular medications  If you are NOT receiving sedation for the procedure:  Do not eat anything for 2 hours prior to your procedure. You may eat a light meal more than 2 hours prior to your procedure.   You may use a sip of water to take your regular medications  For Diabetic  Patients:  Please discuss with your managing physician the best way to take your medications on the procedure day to minimize large increases or decreases in your blood sugar  Please notify clinic of your most recent A1c and when that was performed. Optimizing your blood sugar control prior to the procedure will help decrease your risk of complications, such as infection.   Notify clinic and we will attempt to schedule your procedure as early in the morning as possible to minimize hypoglycemia that may occur while fasting for the procedure.  Please inform clinic if: Dr. Maria L Ordaz's clinic phone number is [phone]  You are pregnant or attempting to become pregnant  You have an implanted electronic device (pacemaker, defibrillator, medication pump, stimulator, etc.)  The electrical current utilized to coagulate the nerves can damage, disrupt or potentially cause a discharge of the electronic device.  You are having signs of infection noted above or are started on antibiotics.  You are allergic to iodinated contrast agents, local anesthetics, or steroids.  You are having other medical procedures around the time of your procedure (within 2 weeks)    Instructions for procedure day:    If you are not having any pain in the area that is going to be evaluated with the procedure, please call and cancel the procedure. The block will not provide valuable information if you are not having any pain.   We ask that you please leave your valuables at home  Avoid moisturizers or scented personal products  Wear loose fitting clothing that you can easily change in & out of  Plan to not be at work on the day of the procedure.   Please remove jewelry.  If you are having a procedure done on your head or neck, please remove any metallic items or hardware, such as dental hardware, jewelry that may obscure the images of the area during the procedure.     After the procedure: You will be sent to a recovery room after the procedure.  You will go home the same day.     Home Care Instructions after the procedure:    Injection site(s)  The injection sites may be covered with a dressing.  You may remove bandage at discharge from the hospital.   Bruising may be present  Avoid soaking or bathing in hot tub, bath or pool on the day of your procedure.   Showering is okay the day of procedure.   Avoid heat to the area  Notify the clinic if you are experiencing increased bleeding or drainage from the injection site(s)  Pain  Mild-moderate pain/discomfort may occur at the injection sites  Pain may be relieved with:  Ice  Tylenol (Acetaminophen)  Injection site pain typically improves after a day  Your baseline pain may improve immediately after the procedure:  Assess and note in your diary on a scale of 0 - 10/10, the intensity of pain at multiple time intervals on the day of your procedure.  Activity/Diet:  Day of the procedure:  Do NOT drive or operate heavy machinery  Resume daily life activities as tolerated. Do this slowly and carefully.  Bed rest is NOT recommended  Avoid strenuous activity, heavy lifting, bending or twisting.   Åvoid activity that requires skill, dexterity or coordination  As pain improves, you can carefully attempt activities that would exacerbate your pain and assess any functional benefits from the procedure.   If you received sedation - For 24 hrs following the procedure DO NOT:   Drive or operate heavy machinery  Drink alcohol  Make any important decisions  Dizziness, vertigo or balance difficulties may occur when having the procedure on the cervical spine (the neck). This happens because the procedure anesthetizes the proprioceptors (signals informing where you are in space)  This can be worsened when looking down or looking sideways  You can help compensate for this by utilizing your visual cues. Always focus on horizontal objects, such as window frames, door frames, or the horizon itself.  This typically improves within  15-30 minutes  Day after the procedure:   Resume daily life activities as tolerated: Let pain be your guide. Progress slowly and try not to over exert yourself if you are feeling good.   If possible, avoid activities that exacerbate your pain  Bed rest is NOT recommended   Physical Therapy (PT): You may restart your home exercise program the day following your procedure.  Diet: You may resume your normal diet as tolerated after the procedure  If nauseated, hold solid foods until nausea has resolved  Medications:  If you held any blood thinner medications for the procedure, you should have been given a specific timeline on when to restart the medication that has been determined in collaboration with your prescriber and our clinic. If you do not know when to restart, please notify clinic.  You may continue all other regular medications as prescribed.     When to call your healthcare provider [phone]  Call your healthcare provider if you have any of these symptoms:  Fever of 100.4°F (38.0°C) or higher. If you feel hot, take your temperature before notifying clinic.  New pain, weakness, tingling, or numbness in your legs. This may be expected in the first several hours after the procedure due to the local anesthetic used during the procedure.   New or worsening back pain   Redness, drainage or bleeding from site  Changes in bowel (incontinence) function  Changes in bladder (incontinence or retention) function  New or unusual headache &/or neck stiffness  Persistent nausea or vomiting with inability to tolerate clear liquids for more than 12 hours       When to seek medical attention  Call 911 right away if you have any of the following:  Chest pain  Shortness of breath  Signs of a stroke: Sudden changes in vision, changes in speech, sudden weakness in your face/arms/legs  Any other medical emergency     Follow-up: Post-procedure clinic appointment: ASAP after procedure to document benefit of procedure        Maria L Ordaz M.D.  [facility] Medicine  Phone: [phone]

## 2025-07-22 ENCOUNTER — TELEPHONE (OUTPATIENT)
Dept: PAIN MEDICINE | Facility: CLINIC | Age: 65
End: 2025-07-22
Payer: COMMERCIAL

## 2025-07-22 DIAGNOSIS — M47.816 LUMBAR SPONDYLOSIS: Primary | ICD-10-CM

## 2025-07-22 NOTE — TELEPHONE ENCOUNTER
----- Message from Martha sent at 7/22/2025  7:24 AM CDT -----  Regarding: YOGESH VALE Approved Auth# [id number] for dates 07/22 to 07/31/26

## 2025-07-28 ENCOUNTER — OUTSIDE PLACE OF SERVICE (OUTPATIENT)
Dept: PAIN MEDICINE | Facility: CLINIC | Age: 65
End: 2025-07-28

## 2025-07-29 ENCOUNTER — OFFICE VISIT (OUTPATIENT)
Dept: PAIN MEDICINE | Facility: CLINIC | Age: 65
End: 2025-07-29
Payer: MEDICARE

## 2025-07-29 VITALS
DIASTOLIC BLOOD PRESSURE: 77 MMHG | BODY MASS INDEX: 25.77 KG/M2 | OXYGEN SATURATION: 98 % | SYSTOLIC BLOOD PRESSURE: 114 MMHG | HEART RATE: 67 BPM | HEIGHT: 70 IN | WEIGHT: 180 LBS

## 2025-07-29 DIAGNOSIS — M47.816 LUMBAR SPONDYLOSIS: Primary | ICD-10-CM

## 2025-07-29 DIAGNOSIS — M53.3 SACROILIAC JOINT PAIN: ICD-10-CM

## 2025-07-29 DIAGNOSIS — M54.51 VERTEBROGENIC LOW BACK PAIN: ICD-10-CM

## 2025-07-29 DIAGNOSIS — D18.09 HEMANGIOMA OF VERTEBRAL BODY: ICD-10-CM

## 2025-07-29 PROCEDURE — 99214 OFFICE O/P EST MOD 30 MIN: CPT | Mod: S$PBB,,, | Performed by: PHYSICIAN ASSISTANT

## 2025-07-29 NOTE — PATIENT INSTRUCTIONS
Pre-Procedural Instructions  Interventional Pain  Medial Branch Block    Purpose:  We are performing diagnostic blocks of particular nerves in order to determine if performing a radiofrequency ablation (burning) of those nerves will provide relief of your pain.   The relief is temporary and used to determine the next step in your treatment  Please provide honest results in the amount of pain relief you obtained. Don't worry, you will not hurt our feelings.  We want to help relieve your pain and can potentially target different structures in order to provide you better relief.   Informed Consent:  These procedures are safe, but like any interventional procedure, it does carry rare risks which include:  Infection   Bleeding  Allergic Reactions  No relief of pain  Temporary sense of imbalance/dizziness can occur when performing cervical medial branch blocks, especially of the third occipital nerve.     Preparing for the procedure:    Tell your healthcare provider about all medicines you take. This includes over-the-counter medicines, herbs, vitamins, and other supplements.  Tell your healthcare provider about any other medical or dental procedures planned in proximity to your procedure.   Reduce Infection Risk:   Shower or bathe the night before and morning of your scheduled procedure.  Notify clinic if you are:  Having signs of illness, such as fevers, or signs of a urinary tract infection (UTI)  Prescribed antibiotics for an infection  Reduce bleeding risk:  For 7 days prior to procedure and during the duration of the trial (until your clinic follow-up):  Stop NSAIDs (ibuprofen/Advil, naproxen/Aleve, Meloxicam/Mobic, Goody's, or others)  Stop Floridalma McLeod, Pepto Bismol, & Herbal Medication/Supplements (such as Ginko, high dose Vitamin E, Fish Oil, Turmeric, Garlic, and others)  Home Support:  You MUST have a responsible  to bring you home from the facility and assist you at home on the day of the procedure    Plan to not be at work on the day of the procedure.   Medications:  Blood thinners: Such as: Aspirin, Plavix, Warfarin (Coumadin), Eliquis, Pradaxa, Xarelto, & others  If you are taking blood thinning medications, the clinic will notify you if you need to hold and of the number days to hold the medication prior to the procedure and when to restart these after the procedure. This will be communicated with and approved by your prescribing physician.   Please notify the office if you are taking blood thinning medications and are unsure if or when you need to hold the medication.   GLP-1 agonists: Semaglutide (Ozempic, Wegovy, Rybelsus), Tirzepatide (Mounjaro, Zepbound), Dulaglutide (Trulicity), Liraglutide, Lixisenatide, Exenatide  These medications can increase the risk of regurgitation and pulmonary aspiration of gastric contents during general anesthesia and deep sedation  If you take this weekly, please hold for 1 week prior to the procedure  If you take this daily, please hold on the day of procedure  If these are utilized for blood sugar control, you will need to discuss with your managing provider on what to utilize for bridging the antidiabetic therapy to maintain blood sugar control and avoiding hyperglycemia  Please take other regular medications as scheduled.  Diet:  If you will be receiving sedation for the procedure.  Do not eat anything for 8 hours prior to your procedure.   You may drink clear liquids up to 4 hours prior to your procedure.   Clear liquids include: water, black coffee, fruit juice without pulp, clear tea  You may drink water up to 2 hours prior to your procedure.  You may use a sip of water to take your regular medications  If you are NOT receiving sedation for the procedure:  Do not eat anything for 2 hours prior to your procedure. You may eat a light meal more than 2 hours prior to your procedure.   You may use a sip of water to take your regular medications  For Diabetic  Patients:  Please discuss with your managing physician the best way to take your medications on the procedure day to minimize large increases or decreases in your blood sugar  Please notify clinic of your most recent A1c and when that was performed. Optimizing your blood sugar control prior to the procedure will help decrease your risk of complications, such as infection.   Notify clinic and we will attempt to schedule your procedure as early in the morning as possible to minimize hypoglycemia that may occur while fasting for the procedure.  Please inform clinic if: Dr. Maria L Ordaz's clinic phone number is [phone]  You are pregnant or attempting to become pregnant  You have an implanted electronic device (pacemaker, defibrillator, medication pump, stimulator, etc.)  The electrical current utilized to coagulate the nerves can damage, disrupt or potentially cause a discharge of the electronic device.  You are having signs of infection noted above or are started on antibiotics.  You are allergic to iodinated contrast agents, local anesthetics, or steroids.  You are having other medical procedures around the time of your procedure (within 2 weeks)    Instructions for procedure day:    If you are not having any pain in the area that is going to be evaluated with the procedure, please call and cancel the procedure. The block will not provide valuable information if you are not having any pain.   We ask that you please leave your valuables at home  Avoid moisturizers or scented personal products  Wear loose fitting clothing that you can easily change in & out of  Plan to not be at work on the day of the procedure.   Please remove jewelry.  If you are having a procedure done on your head or neck, please remove any metallic items or hardware, such as dental hardware, jewelry that may obscure the images of the area during the procedure.     After the procedure: You will be sent to a recovery room after the procedure.  You will go home the same day.     Home Care Instructions after the procedure:    Injection site(s)  The injection sites may be covered with a dressing.  You may remove bandage at discharge from the hospital.   Bruising may be present  Avoid soaking or bathing in hot tub, bath or pool on the day of your procedure.   Showering is okay the day of procedure.   Avoid heat to the area  Notify the clinic if you are experiencing increased bleeding or drainage from the injection site(s)  Pain  Mild-moderate pain/discomfort may occur at the injection sites  Pain may be relieved with:  Ice  Tylenol (Acetaminophen)  Injection site pain typically improves after a day  Your baseline pain may improve immediately after the procedure:  Assess and note in your diary on a scale of 0 - 10/10, the intensity of pain at multiple time intervals on the day of your procedure.  Activity/Diet:  Day of the procedure:  Do NOT drive or operate heavy machinery  Resume daily life activities as tolerated. Do this slowly and carefully.  Bed rest is NOT recommended  Avoid strenuous activity, heavy lifting, bending or twisting.   Åvoid activity that requires skill, dexterity or coordination  As pain improves, you can carefully attempt activities that would exacerbate your pain and assess any functional benefits from the procedure.   If you received sedation - For 24 hrs following the procedure DO NOT:   Drive or operate heavy machinery  Drink alcohol  Make any important decisions  Dizziness, vertigo or balance difficulties may occur when having the procedure on the cervical spine (the neck). This happens because the procedure anesthetizes the proprioceptors (signals informing where you are in space)  This can be worsened when looking down or looking sideways  You can help compensate for this by utilizing your visual cues. Always focus on horizontal objects, such as window frames, door frames, or the horizon itself.  This typically improves within  15-30 minutes  Day after the procedure:   Resume daily life activities as tolerated: Let pain be your guide. Progress slowly and try not to over exert yourself if you are feeling good.   If possible, avoid activities that exacerbate your pain  Bed rest is NOT recommended   Physical Therapy (PT): You may restart your home exercise program the day following your procedure.  Diet: You may resume your normal diet as tolerated after the procedure  If nauseated, hold solid foods until nausea has resolved  Medications:  If you held any blood thinner medications for the procedure, you should have been given a specific timeline on when to restart the medication that has been determined in collaboration with your prescriber and our clinic. If you do not know when to restart, please notify clinic.  You may continue all other regular medications as prescribed.     When to call your healthcare provider [phone]  Call your healthcare provider if you have any of these symptoms:  Fever of 100.4°F (38.0°C) or higher. If you feel hot, take your temperature before notifying clinic.  New pain, weakness, tingling, or numbness in your legs. This may be expected in the first several hours after the procedure due to the local anesthetic used during the procedure.   New or worsening back pain   Redness, drainage or bleeding from site  Changes in bowel (incontinence) function  Changes in bladder (incontinence or retention) function  New or unusual headache &/or neck stiffness  Persistent nausea or vomiting with inability to tolerate clear liquids for more than 12 hours       When to seek medical attention  Call 911 right away if you have any of the following:  Chest pain  Shortness of breath  Signs of a stroke: Sudden changes in vision, changes in speech, sudden weakness in your face/arms/legs  Any other medical emergency     Follow-up: Post-procedure clinic appointment: ASAP after procedure to document benefit of procedure        Maria L Ordaz M.D.  [facility] [place] [facility]  Phone: [phone]

## 2025-07-29 NOTE — PROGRESS NOTES
[facility]        Chief Complaint:   Chief Complaint   Patient presents with    Low-back Pain       History of Present Illness: Shane Patino is a 65 y.o. male referred by Rayshawn Saldaña NP for lower back pain.      LBP  Onset: April/May 2025   Onset was Gradual  Accident or Work Related: No    Since Onset, Pain has been worsening  Location: left low back  Radiation: left buttocks, left lateral hip into left anterior thigh, stays above knee  Quality: Aching  Timing: Constant  Exacerbating Factors: Standing, Walking, Lifting, and Morning time, Extension   Alleviating Factors: Sitting, Lying down, and Cold/Ice, Flexion  Review of Symptoms: Denies weakness in leg(s) , Denies numbness in leg(s) , Denies saddle anesthesia, Denies night sweats, Denies fevers, (+) changes in gait, (+) pain waking at night, (+) history of cancer (melanoma), Denies unexplained weight loss, Denies changes in bowel function, Denies changes in bladder function.    Patient has  failed > 4 weeks of conservative treatment including: Activity modification (avoiding exacerbating factors), chiropractic care, and oral medications. Physical therapy has not been attempted.     Pain Severity Scores:   Currently: 10/10      Worst Pain: 10/10     Least Pain: 3/10  Average Pain: 8/10    Pain Disability Index  Family/Home Responsibilities:: 10  Recreation:: 10  Social Activity:: 7  Occupation:: 10  Sexual Behavior:: 9  Self Care:: 5  Life-Support Activities:: 6  Pain Disability Index (PDI): 57    Opioid Risk Score       None             Prior Diagnostic Evaluation: X-ray      Previous Therapies/Treatments:  Activity Modification (rest, avoiding aggravating factors, etc.): Yes - Helpful  Heat (heating pads, etc.): Yes - Helpful  Cold (ice packs): Yes - Helpful  Physician guided home exercise program: Yes - Not Helpful  PT/OT: Not tried  Chiropractor: Yes - Not Helpful  Acupuncture: Not tried  Massage: Not tried  Bracing: Not tried  TENS unit:  Yes - Not Helpful    Previous Medications:   - NSAIDS: Acetaminophen (Tylenol) Tried - Helpful and Ibuprofen (Advil) Tried - Helpful  - Muscle Relaxants: Methocarbamol (Robaxin) Tried - Not Helpful, Tizanidine - Not Helpful  - TCAs: Not tried  - SNRIs/Antidepressants for Pain: Not tried  - Topicals: Menthol methyl salicylate (Bengay, Acuplus, etc.) Tried - Not Helpful  - Anticonvulsants: Gabapentin (Neurontin) Tried - Not Helpful  - Opioids: Hydrocodone/Acetaminophen (e.g., Lorcet, Norco, Lortab)    Relevant Surgery: no     Previous Interventions for Pain:  - 7/28/25: Left L4/5 & L5/S1 diagnostic MBB w/ 100% relief   - 6/30/25: Left L3 & L4 TF MACK w/ 75% relief         Interval History (07/21/2025):  Shane Patino returns today for follow up.  At the last clinic visit, scheduled left L3 & left L4 transforaminal epidural steroid injection under fluoroscopic guidance with local/MAC sedation.    Left L3 & left L4 TF MACK provided 75% relief and greater relief of left leg pain.     Currently, the low back pain is improved but still limiting. Pain is now to left lower back without radiation into lower extremity. Denies any changes in bowel or bladder function. Denies any new weakness or new numbness since the most recent visit. Denies any fevers or recent infections/antibiotics.     He continues Gabapentin 300mg qHS, which is helping with pain and sleep. Denies side effects.    Current Pain Scales:  Current: 4/10              Average: 4/10  Least-Worst: 3-7/10           7/29/2025     2:43 PM 7/21/2025     9:08 AM 6/23/2025     8:46 AM   Last 3 PDI Scores   Pain Disability Index (PDI) 38 34 52       Interval History (07/29/2025):  Shane Patino returns today for follow up.  At the last clinic visit, scheduled diagnostic MBB Schedule of left L4/5 & L5/S1 facet joints     The left  L4/5 & L5/S1 diagnostic MBB provided 100% relief (Pain scale went from 5/10 pre-procedure to post-procedure pain of 0/10 during anesthetic  "effect for 1-2 hrs following procedure).     Currently, the low back pain is stable and returned to baseline.  Denies any changes in bowel or bladder function. Denies any new weakness or new numbness since the most recent visit. Denies any fevers or recent infections/antibiotics.     Current Pain Scales:  Current: 5/10              Average: 5/10  Least-Worst: 0-7/10           7/29/2025     2:43 PM 7/21/2025     9:08 AM 6/23/2025     8:46 AM   Last 3 PDI Scores   Pain Disability Index (PDI) 38 34 52          Current Pain Medications:  Tizanidine 4mg qHS PRN  Meloxicam 15mg   Gabapentin 300mg qHS    Blood Thinners: NONE    Full Medication List:  Current Medications[1]     Review of Systems: See HPI    Allergies:  Patient has no known allergies.     Medical History:   has a past medical history of Anxiety disorder, unspecified, BMI 25.1, History of colonic polyps, History of skin cancer, Testosterone deficiency, and Wegener's granulomatosis without renal involvement.    Surgical History:   has a past surgical history that includes Clavicle surgery (Left); Excision of melanoma (Left); [place] tooth extraction; and Colonoscopy w/ polypectomy (09/25/2018).    Social History:   reports that he has never smoked. He has never used smokeless tobacco. He reports that he does not drink alcohol and does not use drugs.    Family History:  family history includes Crohn's disease in his mother; Dementia in his paternal grandmother; Heart disease in his father and paternal grandfather; No Known Problems in his maternal grandmother.      Physical Exam:  /77 (Patient Position: Sitting)   Pulse 67   Ht 5' 10" (1.778 m)   Wt 81.6 kg (180 lb)   SpO2 98%   BMI 25.83 kg/m²   GEN: No acute distress. Calm, comfortable  HENT: Normocephalic, atraumatic, moist mucous membranes  EYE: Anicteric sclera, non-injected.   CV: Non-diaphoretic. Regular Rate. Radial Pulses 2+.  RESP: Breathing comfortably. Chest expansion symmetric.  EXT: No " clubbing, cyanosis.   SKIN: Warm, & dry to palpation. No visible rashes or lesions of exposed skin.   PSYCH: Pleasant mood and appropriate affect. Recent and remote memory intact.   GAIT: Independent, normal ambulation  Lumbar Spine Exam:       Inspection: No erythema, bruising. Right Lateral shift.       Palpation:   - (+) TTP of lumbar paraspinals on left.  - (+) TTP of sacroiliac joint on left.  - (-) TTP of mildline spinous processes       ROM: + Limited in flexion. (+) limited in extension, (-) limitation in lateral bending bilateral.    Directional Preference: Flexion      Provocative Maneuvers:  (+) Facet loading bilaterally  (-) Straight Leg Raise bilaterally  (-) XI bilaterally  Hip Exam:      Inspection: No gross deformity or apparent leg length discrepancy      Palpation: - TTP to greater trochanteric bursa(s) bilaterally.       ROM: (+) limitation in internal rotation right, (-) limitation in external rotation bilateral  Neurologic Exam:     Alert. Speech is fluent and appropriate.     Strength:  5/5 throughout bilateral lower extremities     Sensation:  Grossly intact to light touch in bilateral lower extremities     Reflexes: 2+ in b/l patella, achilles     Tone: No abnormality appreciated in bilateral lower extremities     No Clonus            Imaging:  - MRI Lumbar spine 6/6/25:  Probable hemangioma in the L5 vertebral body measuring 2.3 cm in craniocaudal dimension. Severe degenerative disc disease L3-L4 with Modic type I discogenic marrow edema. Modic type II fatty degenerative marrow signal L5-S1. Mild Modic type II degenerative marrow signal at L1-L2 with anterior osteophytes. Dextroscoliosis   Otherwise, normal vertebral body height, alignment, and marrow signal.  Distal spinal cord and Conus are within normal limits.     L1-2:  No evidence of spinal canal or foraminal stenosis.     L2-3:  Unremarkable     L3-4:  Degenerative disc disease with disc desiccation disc height loss and marginal  "osteophytes and Modic type I discogenic marrow edema with left foraminal left lateral disc protrusion severely narrowing the left neural foramen and impinging on the left-sided L3 nerve. Mild narrowing of the right L3 neural foramen. Annular disc bulging and facet DJD ligament thickening and small bilateral facet joint effusions with moderate left and mild right anterolateral recess stenosis and potential for traversing nerve root impingement.     L4-5:  Annular disc bulge, facet DJD, ligament thickening, bilateral facet joint effusions moderately narrowing the anterolateral recess in the bilateral neural foramen     L5-S1:  Right foraminal subarticular disc protrusion moderate to severely narrowing the right neural foramen and the right anterolateral recess with moderate left anterolateral recess stenosis and mild left foraminal stenosis. Facet DJD and facet joint effusions contributes to stenosis.      - X-ray Lumbar spine 25:   Mild degenerative disc narrowing of the L3-4 disc level.  Minor multilevel spurring.     The alignment of the lumbar vertebra is normal.  No subluxation with flexion or extension.  Increased narrowing of the anterior disc space at L3-4 with flexion.    - X-ray Bilateral hips 25:   FINDINGS: 2 views. No acute fracture or dislocation. No acute bony abnormality. Joint spaces are relatively well-preserved.     Labs:  CMP 25:      CBC 25:        BMP  No results found for: "NA", "K", "CL", "CO2", "BUN", "CREATININE", "CALCIUM", "ANIONGAP", "EGFRNORACEVR"  No results found for: "ALT", "AST", "GGT", "ALKPHOS", "BILITOT"  No results found for: "PLT"  No results found for: "LABA1C", "HGBA1C"      Assessment:  Shane Patino is a 65 y.o. male with the following diagnoses based on history, exam, and imagin. Lumbar spondylosis    2. Sacroiliac joint pain    3. Vertebrogenic low back pain    4. Hemangioma of vertebral body              This is a pleasant 65 y.o. gentleman " presenting with:     - Left lower back and prior left radicular leg pain in L3/4 disribution   - MRI with left lateral disc protrusion at L3-4 resulting in severe left foraminal stenosis and moderate left lateral recess stenosis at L3-4. Facet arthropathy with facet joint effusions at bilateral L4/5 and L5/S1, Modic 1 changes at L3-4, Modic 2 changes at L5-S1.    - Leg pain improved following Left L3 & L4 TF MACK, left sided axial back pain persists.    - Patient with moderate to severe chronic low back pain that is predominantly axial with radicular pain that resolved with MACK, but axial pain persists.. The pain is severe enough to greatly impact quality of life &/or function as evidenced on the patients functional/disability score and NRS.    - Pain persists for greater than 3 months despite conservative treatment, including: Activity modification (avoiding exacerbating factors), physical therapy, chiropractic care, physician-led home exercise program, and oral medications.    - Facet joints currently suspected as primary pain generator based on clinical assessment & radiology studies, as there is no fracture, tumor, infection, and significant deformity. There is NO surgical fusion (such as Anterior Lumbar Interbody Fusion) at the spinal segment to be targeted..   - This facet level has not undergone ablation in the previous 2 years.    - Patient reports greater than or equal to 80% relief from previous MBB at this segment with preprocedure pain score noted as 5/10 and post-procedure pain score noted as 0/10 with consistent relief for duration of local anesthetic (1-2 hrs).   - Comorbidities: Wegener's Granulomatosis. Melanoma. HTN. Anxiety. Depression.    Treatment Plan:   - PT/OT/HEP: Cont HEP learned in PT. Discussed benefits of exercise for pain.   - Procedures: Schedule diagnostic medial branch block (#2/2) of left L4/5 & L5/S1 facet joints under fluoroscopic guidance with local/MAC sedation (versed only) due  to prior vasovagal reaction (CPT Codes 60217, 37560, KX modifier). If MBB successful x 2, plan for RFA. The patient is not allergic to iodinated contrast. Patient is not currently taking blood thinners for cardiovascular prophylaxis    - Consider BVNA at L3, L4, L5, S1 if no relief with above.  - Medications:  Cont Gabapentin 300mg qHS.   - Imaging: Reviewed. Order CT of Lumbar spine w/o contrast to further assess probable hemangioma at L5, pending  - Labs: Reviewed.     Follow Up: RTC ASAP after MBB or sooner PRN      Kristina Lees PA-C  Interventional Pain Medicine                      [1]   Current Outpatient Medications:     0.9% NaCl SolP 500 mL with riTUXimab 10 mg/mL Conc 500 mg/m2, Inject 500 mg/m2 into the vein every 6 (six) months., Disp: , Rfl:     budesonide, micronized, bulk, 100 % Powd, 1 Application by Nasal route 2 (two) times daily as needed., Disp: , Rfl:     busPIRone (BUSPAR) 10 MG tablet, Take 10 mg by mouth 2 (two) times daily., Disp: , Rfl:     colistimethate (COLYMYCIN) 150 mg injection, EMPTY CONTENTS OF 1 VIAL INTO NASAL IRRIGATION SYSTEM, ADD DISTILLED WATER, SALT PACK, MIX & IRRIGATE PERFORM 2 TIMES DAILY, Disp: , Rfl:     gabapentin (NEURONTIN) 300 MG capsule, Take 1 capsule (300 mg total) by mouth every evening., Disp: 30 capsule, Rfl: 3    LIDOcaine (LIDODERM) 5 %, , Disp: , Rfl:     losartan (COZAAR) 50 MG tablet, , Disp: , Rfl:     meloxicam (MOBIC) 15 MG tablet, Take 15 mg by mouth., Disp: , Rfl:     predniSONE (DELTASONE) 5 MG tablet, , Disp: , Rfl:     riTUXimab (RITUXAN) 10 mg/mL injection, Inject 500 mg into the vein., Disp: , Rfl:     sertraline (ZOLOFT) 100 MG tablet, , Disp: , Rfl:     sulfamethoxazole-trimethoprim 800-160mg (BACTRIM DS) 800-160 mg Tab, Take 1 tablet by mouth 3 (three) times a week., Disp: , Rfl:     TAVNEOS 10 mg Cap, , Disp: , Rfl:     testosterone cypionate (DEPOTESTOTERONE CYPIONATE) 200 mg/mL injection, inject 0.5mls INTRAMUSCULARLY ONCE a WEEK, Disp:  , Rfl:     tiZANidine 4 mg Cap, Take 4 mg by mouth nightly as needed., Disp: , Rfl:

## 2025-07-30 ENCOUNTER — TELEPHONE (OUTPATIENT)
Dept: PAIN MEDICINE | Facility: CLINIC | Age: 65
End: 2025-07-30
Payer: COMMERCIAL

## 2025-07-30 DIAGNOSIS — M47.816 LUMBAR SPONDYLOSIS: Primary | ICD-10-CM

## 2025-07-30 NOTE — TELEPHONE ENCOUNTER
----- Message from Martha sent at 7/30/2025 12:25 PM CDT -----  Regarding: PA  Approved Auth# [id number] forr dates 08/18 to 08/27/2026

## 2025-08-18 ENCOUNTER — OUTSIDE PLACE OF SERVICE (OUTPATIENT)
Dept: PAIN MEDICINE | Facility: CLINIC | Age: 65
End: 2025-08-18
Payer: COMMERCIAL

## 2025-08-19 ENCOUNTER — OFFICE VISIT (OUTPATIENT)
Dept: PAIN MEDICINE | Facility: CLINIC | Age: 65
End: 2025-08-19
Payer: MEDICARE

## 2025-08-19 VITALS
WEIGHT: 181.13 LBS | HEART RATE: 63 BPM | BODY MASS INDEX: 25.99 KG/M2 | DIASTOLIC BLOOD PRESSURE: 83 MMHG | RESPIRATION RATE: 20 BRPM | OXYGEN SATURATION: 98 % | SYSTOLIC BLOOD PRESSURE: 132 MMHG

## 2025-08-19 DIAGNOSIS — M47.816 LUMBAR SPONDYLOSIS: Primary | ICD-10-CM

## 2025-08-19 DIAGNOSIS — M54.51 VERTEBROGENIC LOW BACK PAIN: ICD-10-CM

## 2025-08-19 PROCEDURE — 99213 OFFICE O/P EST LOW 20 MIN: CPT | Mod: S$PBB,,, | Performed by: PHYSICIAN ASSISTANT

## 2025-08-20 ENCOUNTER — TELEPHONE (OUTPATIENT)
Dept: PAIN MEDICINE | Facility: CLINIC | Age: 65
End: 2025-08-20
Payer: COMMERCIAL

## 2025-08-20 DIAGNOSIS — M47.816 LUMBAR SPONDYLOSIS: Primary | ICD-10-CM
